# Patient Record
Sex: MALE | Race: WHITE | NOT HISPANIC OR LATINO | Employment: FULL TIME | URBAN - METROPOLITAN AREA
[De-identification: names, ages, dates, MRNs, and addresses within clinical notes are randomized per-mention and may not be internally consistent; named-entity substitution may affect disease eponyms.]

---

## 2023-01-24 ENCOUNTER — OFFICE VISIT (OUTPATIENT)
Dept: URGENT CARE | Facility: CLINIC | Age: 23
End: 2023-01-24

## 2023-01-24 VITALS
WEIGHT: 185 LBS | TEMPERATURE: 98 F | DIASTOLIC BLOOD PRESSURE: 70 MMHG | HEART RATE: 98 BPM | HEIGHT: 71 IN | BODY MASS INDEX: 25.9 KG/M2 | SYSTOLIC BLOOD PRESSURE: 134 MMHG | OXYGEN SATURATION: 98 %

## 2023-01-24 DIAGNOSIS — R11.10 VOMITING, UNSPECIFIED VOMITING TYPE, UNSPECIFIED WHETHER NAUSEA PRESENT: Primary | ICD-10-CM

## 2023-01-24 PROCEDURE — 99202 OFFICE O/P NEW SF 15 MIN: CPT | Mod: S$GLB,,, | Performed by: NURSE PRACTITIONER

## 2023-01-24 PROCEDURE — 99202 PR OFFICE/OUTPT VISIT, NEW, LEVL II, 15-29 MIN: ICD-10-PCS | Mod: S$GLB,,, | Performed by: NURSE PRACTITIONER

## 2023-01-24 NOTE — PROGRESS NOTES
"Subjective:       Patient ID: Shaun Mcguire is a 22 y.o. male.    Vitals:  height is 5' 11" (1.803 m) and weight is 83.9 kg (185 lb). His oral temperature is 98.4 °F (36.9 °C). His blood pressure is 134/70 and his pulse is 98. His oxygen saturation is 98%.     Chief Complaint: Emesis    This is a 22 y.o. male who presents today with a chief complaint of: drank bad milk and started vomiting yesterday for an hour. He reports complete resolution of symptoms. Patient stated that he just needs a work note stating that he may return to work.      Emesis   This is a new problem. The current episode started yesterday. There has been no fever. Risk factors include suspect food intake.     Gastrointestinal:  Positive for vomiting.         Objective:      Physical Exam   Constitutional: He is oriented to person, place, and time. normal  HENT:   Head: Normocephalic and atraumatic.   Nose: Nose normal.   Mouth/Throat: Mucous membranes are moist. Oropharynx is clear.   Eyes: Conjunctivae are normal. Pupils are equal, round, and reactive to light. Extraocular movement intact   Neck: Neck supple.   Cardiovascular: Normal rate, regular rhythm, normal heart sounds and normal pulses.   Pulmonary/Chest: Effort normal and breath sounds normal.   Abdominal: Normal appearance and bowel sounds are normal. Soft. flat abdomen   Musculoskeletal: Normal range of motion.         General: Normal range of motion.   Neurological: He is alert and oriented to person, place, and time.   Skin: Skin is warm and dry.   Psychiatric: His behavior is normal. Mood normal.   Vitals reviewed.      Past medical history and current medications reviewed.       Assessment:           1. Vomiting, unspecified vomiting type, unspecified whether nausea present              Plan:         Vomiting, unspecified vomiting type, unspecified whether nausea present             There are no Patient Instructions on file for this visit.                           "